# Patient Record
Sex: MALE | Race: WHITE | NOT HISPANIC OR LATINO | URBAN - METROPOLITAN AREA
[De-identification: names, ages, dates, MRNs, and addresses within clinical notes are randomized per-mention and may not be internally consistent; named-entity substitution may affect disease eponyms.]

---

## 2020-11-03 ENCOUNTER — HOSPITAL ENCOUNTER (EMERGENCY)
Facility: HOSPITAL | Age: 50
Discharge: HOME | End: 2020-11-03
Attending: EMERGENCY MEDICINE
Payer: COMMERCIAL

## 2020-11-03 VITALS
SYSTOLIC BLOOD PRESSURE: 117 MMHG | BODY MASS INDEX: 37.77 KG/M2 | HEART RATE: 71 BPM | WEIGHT: 255 LBS | DIASTOLIC BLOOD PRESSURE: 58 MMHG | TEMPERATURE: 98 F | HEIGHT: 69 IN | OXYGEN SATURATION: 98 % | RESPIRATION RATE: 18 BRPM

## 2020-11-03 DIAGNOSIS — S01.81XA FACIAL LACERATION, INITIAL ENCOUNTER: ICD-10-CM

## 2020-11-03 DIAGNOSIS — S09.90XA INJURY OF HEAD, INITIAL ENCOUNTER: Primary | ICD-10-CM

## 2020-11-03 DIAGNOSIS — T14.90XA TRAUMA: ICD-10-CM

## 2020-11-03 PROCEDURE — 25000003 PHARM REV CODE 250: Performed by: EMERGENCY MEDICINE

## 2020-11-03 PROCEDURE — 90715 TDAP VACCINE 7 YRS/> IM: CPT | Performed by: EMERGENCY MEDICINE

## 2020-11-03 PROCEDURE — 99285 EMERGENCY DEPT VISIT HI MDM: CPT | Mod: 25

## 2020-11-03 PROCEDURE — 90471 IMMUNIZATION ADMIN: CPT | Performed by: EMERGENCY MEDICINE

## 2020-11-03 PROCEDURE — 63600175 PHARM REV CODE 636 W HCPCS: Performed by: EMERGENCY MEDICINE

## 2020-11-03 PROCEDURE — 12011 RPR F/E/E/N/L/M 2.5 CM/<: CPT

## 2020-11-03 RX ORDER — CYCLOBENZAPRINE HCL 10 MG
10 TABLET ORAL 3 TIMES DAILY PRN
Qty: 15 TABLET | Refills: 0 | Status: SHIPPED | OUTPATIENT
Start: 2020-11-03 | End: 2020-11-08

## 2020-11-03 RX ORDER — BACITRACIN 500 [USP'U]/G
OINTMENT TOPICAL 3 TIMES DAILY
Status: DISCONTINUED | OUTPATIENT
Start: 2020-11-03 | End: 2020-11-03 | Stop reason: HOSPADM

## 2020-11-03 RX ORDER — LIDOCAINE HYDROCHLORIDE 10 MG/ML
10 INJECTION INFILTRATION; PERINEURAL
Status: COMPLETED | OUTPATIENT
Start: 2020-11-03 | End: 2020-11-03

## 2020-11-03 RX ORDER — IBUPROFEN 600 MG/1
600 TABLET ORAL EVERY 6 HOURS PRN
Qty: 20 TABLET | Refills: 0 | Status: SHIPPED | OUTPATIENT
Start: 2020-11-03

## 2020-11-03 RX ADMIN — LIDOCAINE HYDROCHLORIDE 10 ML: 10 INJECTION, SOLUTION INFILTRATION; PERINEURAL at 01:11

## 2020-11-03 RX ADMIN — BACITRACIN: 500 OINTMENT TOPICAL at 01:11

## 2020-11-03 RX ADMIN — CLOSTRIDIUM TETANI TOXOID ANTIGEN (FORMALDEHYDE INACTIVATED), CORYNEBACTERIUM DIPHTHERIAE TOXOID ANTIGEN (FORMALDEHYDE INACTIVATED), BORDETELLA PERTUSSIS TOXOID ANTIGEN (GLUTARALDEHYDE INACTIVATED), BORDETELLA PERTUSSIS FILAMENTOUS HEMAGGLUTININ ANTIGEN (FORMALDEHYDE INACTIVATED), BORDETELLA PERTUSSIS PERTACTIN ANTIGEN, AND BORDETELLA PERTUSSIS FIMBRIAE 2/3 ANTIGEN 0.5 ML: 5; 2; 2.5; 5; 3; 5 INJECTION, SUSPENSION INTRAMUSCULAR at 12:11

## 2020-11-03 NOTE — ED PROVIDER NOTES
Encounter Date: 11/3/2020    SCRIBE #1 NOTE: I, Gabe Sommer, am scribing for, and in the presence of,  Leland Rodriguez MD. I have scribed the following portions of the note - Other sections scribed: HPI, ROS, PE.       History     Chief Complaint   Patient presents with    Head Injury     Door motor and gear fell 20 feet from air and hit pateint while he was at work. Pt has laceration to lateral right eye and burising to right shoulder. Reports LOC approx 5 sec    Shoulder Pain     This 50 y.o M with a hx of High cholesterol presents to the ED via [facility] c/o a head injury and right shoulder pain after a 60 pound motor fell on him from approximately 20 feet. He was wearing a hard hat at the time. He reports LOC lasting approximately 5 seconds. He also c/o a laceration near the right eye and an abrasion to the right shoulder. Per EMS, the pt was ambulatory on the scene. He did not arrive with a C-collar. He cannot recall his most recent tetanus vaccination. He denies fever, chills, diaphoresis, numbness, nausea, emesis, diarrhea, abdominal pain, chest pain, SOB, hematuria, rash and any other associated symptoms. He does not smoke cigarettes, consume EtOH or use illicit drugs.    The history is provided by the patient.     Review of patient's allergies indicates:  No Known Allergies  Past Medical History:   Diagnosis Date    High cholesterol      Past Surgical History:   Procedure Laterality Date    HERNIA REPAIR      LIPOMA RESECTION       History reviewed. No pertinent family history.  Social History     Tobacco Use    Smoking status: Never Smoker   Substance Use Topics    Alcohol use: Yes     Comment: occ    Drug use: Never     Review of Systems   Constitutional: Negative for chills and fever.   HENT: Negative for rhinorrhea and sore throat.    Eyes: Negative for redness.   Respiratory: Negative for cough.    Cardiovascular: Negative for chest pain.   Gastrointestinal: Negative for abdominal pain,  diarrhea, nausea and vomiting.   Genitourinary: Negative for dysuria.   Musculoskeletal: Negative for back pain.        (+) R shoulder pain   Skin: Negative for color change and rash.        (+) laceration near the right eye   (+) abrasion to the right shoulder   Neurological: Positive for syncope. Negative for weakness.   Psychiatric/Behavioral: The patient is not nervous/anxious.        Physical Exam     Initial Vitals [11/03/20 1120]   BP Pulse Resp Temp SpO2   105/69 68 18 97.7 °F (36.5 °C) 95 %      MAP       --         Physical Exam    Nursing note and vitals reviewed.  Constitutional: He appears well-developed and well-nourished.   HENT:   Head: Normocephalic and atraumatic.   Mouth/Throat: Mucous membranes are normal.   Patent   Eyes: Conjunctivae and EOM are normal. Pupils are equal, round, and reactive to light. Right conjunctiva is not injected. Left conjunctiva is not injected.   sclera anicteric   Neck: Full passive range of motion without pain. Neck supple.   Cardiovascular: Regular rhythm, S1 normal, S2 normal and normal heart sounds. Exam reveals no gallop and no friction rub.    No murmur heard.  Pulses:       Radial pulses are 2+ on the right side and 2+ on the left side.   Pulmonary/Chest: Effort normal and breath sounds normal. No respiratory distress.   Abdominal: Soft. Normal appearance. He exhibits no distension. There is no abdominal tenderness.   Musculoskeletal:      Right shoulder: He exhibits tenderness.      Comments: No tenderness to elbows including olecranon process bilaterally. No axial loading. No pain to snuff box bilaterally. No pain to pelvis.      Neurological: He is alert. No cranial nerve deficit or sensory deficit. Gait normal.   A&Ox4, normal speech. No C, T or L spine tenderness.    Skin: Skin is warm. No ecchymosis and no rash noted.   No bruising on the back. V shaped laceration near the R eye. Abrasion to the R shoulder.    Psychiatric: He has a normal mood and affect.  Thought content normal.         ED Course   Lac Repair    Date/Time: 11/3/2020 3:23 PM  Performed by: Leland Rodriguez MD  Authorized by: Leland Rodriguez MD     Consent:     Consent obtained:  Verbal    Consent given by:  Patient    Alternatives discussed:  No treatment  Anesthesia (see MAR for exact dosages):     Anesthesia method:  None  Laceration details:     Location:  Face    Face location:  R cheek    Length (cm):  1  Repair type:     Repair type:  Simple  Pre-procedure details:     Preparation:  Patient was prepped and draped in usual sterile fashion  Exploration:     Hemostasis achieved with:  Direct pressure    Wound exploration: wound explored through full range of motion      Contaminated: no    Treatment:     Area cleansed with:  Soap and water and saline    Amount of cleaning:  Extensive    Irrigation solution:  Sterile saline    Irrigation volume:  500    Irrigation method:  Pressure wash    Visualized foreign bodies/material removed: no    Skin repair:     Repair method:  Sutures    Suture size:  5-0    Suture material:  Prolene    Suture technique:  Simple interrupted    Number of sutures:  2  Approximation:     Approximation:  Close  Post-procedure details:     Dressing:  Antibiotic ointment and sterile dressing    Patient tolerance of procedure:  Tolerated well, no immediate complications      Labs Reviewed - No data to display       Imaging Results          CT Lumbar Spine Without Contrast (Final result)  Result time 11/03/20 12:56:15    Final result by Jefferson Mahmood MD (11/03/20 12:56:15)                 Impression:      No acute fracture or traumatic subluxation involving the thoracic or lumbar spine.      Electronically signed by: Jefferson Mahmood  Date:    11/03/2020  Time:    12:56             Narrative:    EXAMINATION:  CT LUMBAR SPINE WITHOUT CONTRAST; CT THORACIC SPINE WITHOUT CONTRAST    CLINICAL HISTORY:  L/S-spine fracture, traumatic;; Polytrauma, critical, T/L spine injury  suspected;    TECHNIQUE:  Low-dose axial, sagittal and coronal reformations are obtained through the thoracic and lumbar spine.  Contrast was not administered.    COMPARISON:  None.    FINDINGS:  No acute fracture or traumatic subluxation.  Relatively modest degenerative findings of the intervertebral discs and facet joints without critical spinal canal or foraminal narrowing.    Subsegmental atelectasis is suggested within the visualized lungs.  Visualized contents of the abdomen and pelvis are unremarkable.                               X-Ray Chest AP Portable (Final result)  Result time 11/03/20 12:48:17    Final result by Jefferson Mahmood MD (11/03/20 12:48:17)                 Impression:      No evidence of acute cardiopulmonary disease.      Electronically signed by: Jefferson Mahmood  Date:    11/03/2020  Time:    12:48             Narrative:    EXAMINATION:  XR CHEST AP PORTABLE    CLINICAL HISTORY:  trauma;    TECHNIQUE:  Single frontal view of the chest was performed.    COMPARISON:  None    FINDINGS:  Cardiomediastinal silhouette within normal limits.  Lungs clear.  Elevation right hemidiaphragm.  No pneumothorax or pleural effusion.  No acute findings suggested in the visualized upper abdomen.  Osseous and soft tissue structures without finding.                               X-ray Shoulder 2 or More Views Right (Final result)  Result time 11/03/20 12:49:53   Procedure changed from X-Ray Shoulder Trauma Right     Final result by Jefferson Mahmood MD (11/03/20 12:49:53)                 Impression:      No acute fracture or dislocation.      Electronically signed by: Jefferson Mahmood  Date:    11/03/2020  Time:    12:49             Narrative:    EXAMINATION:  XR SHOULDER COMPLETE 2 OR MORE VIEWS RIGHT    CLINICAL HISTORY:  trauma;Injury, unspecified, initial encounter    TECHNIQUE:  Two or three views of the right shoulder were performed.    COMPARISON:  None    FINDINGS:  No acute fracture or dislocation.   Degenerative findings are noted at the acromioclavicular joint.  Soft tissue structures unremarkable.                               CT Thoracic Spine Without Contrast (Final result)  Result time 11/03/20 12:56:15    Final result by Jefferson Mahmood MD (11/03/20 12:56:15)                 Impression:      No acute fracture or traumatic subluxation involving the thoracic or lumbar spine.      Electronically signed by: Jefferson Mahmood  Date:    11/03/2020  Time:    12:56             Narrative:    EXAMINATION:  CT LUMBAR SPINE WITHOUT CONTRAST; CT THORACIC SPINE WITHOUT CONTRAST    CLINICAL HISTORY:  L/S-spine fracture, traumatic;; Polytrauma, critical, T/L spine injury suspected;    TECHNIQUE:  Low-dose axial, sagittal and coronal reformations are obtained through the thoracic and lumbar spine.  Contrast was not administered.    COMPARISON:  None.    FINDINGS:  No acute fracture or traumatic subluxation.  Relatively modest degenerative findings of the intervertebral discs and facet joints without critical spinal canal or foraminal narrowing.    Subsegmental atelectasis is suggested within the visualized lungs.  Visualized contents of the abdomen and pelvis are unremarkable.                               CT Cervical Spine Without Contrast (Final result)  Result time 11/03/20 12:37:37    Final result by Austin Sheridan MD (11/03/20 12:37:37)                 Impression:      No CT evidence of cervical spine acute osseous traumatic injury.    Cervical spondylosis most prominent at C5-6 level, as above.      Electronically signed by: Austin Sheridan MD  Date:    11/03/2020  Time:    12:37             Narrative:    EXAMINATION:  CT CERVICAL SPINE WITHOUT CONTRAST    CLINICAL HISTORY:  Neck pain, recent trauma;    TECHNIQUE:  Low dose axial images, sagittal and coronal reformations were performed though the cervical spine.  Contrast was not administered.    COMPARISON:  None    FINDINGS:  Bones are well mineralized.   Levocurvature with straightening of the cervical lordosis, likely related to positioning or muscle strain.  Vertebral body heights appear relatively maintained.  No displaced fracture, dislocation or significant listhesis.  No prevertebral soft tissue swelling.  No paraspinal mass or fluid collection.  Mild degenerative change at the atlantodental interval.  Dens and lateral masses are otherwise well aligned and intact.  Slight loss of disc height with endplate changes and small marginal osteophytes with uncovertebral and facet arthrosis at C5-6 level.  Remaining intervertebral disc space heights are maintained.  Minimal to mild degenerative change elsewhere.    C2-3: Mild right neural foraminal narrowing.  No significant spinal canal stenosis or left neural foraminal narrowing.    C3-4: Moderate right neural foraminal narrowing.  No significant spinal canal stenosis or left neural foraminal narrowing.    C4-5: Posterior central disc protrusion resulting in minimal acquired canal stenosis.  Minimal right neural foraminal narrowing.  No significant left neural foraminal narrowing.    C5-6: Posterior disc osteophyte complex asymmetric to the left resulting in mild to moderate acquired canal stenosis.  Moderate right and mild left neural foraminal narrowing.    C6-7: Posterior broad-based disc bulge resulting in minimal acquired canal stenosis.  No significant neural foraminal narrowing.    C7-T1: No significant spinal canal stenosis or neural foraminal narrowing.    Included airway is midline and patent.  No apical pneumothorax.                               CT Head Without Contrast (Final result)  Result time 11/03/20 12:14:02    Final result by Austin Sheridan MD (11/03/20 12:14:02)                 Impression:      No acute intracranial abnormality.      Electronically signed by: Austin Sheridan MD  Date:    11/03/2020  Time:    12:14             Narrative:    EXAMINATION:  CT HEAD WITHOUT CONTRAST    CLINICAL  HISTORY:  Head trauma, mod-severe;    TECHNIQUE:  Low dose axial CT images obtained throughout the head without intravenous contrast. Sagittal and coronal reconstructions were performed.    COMPARISON:  None.    FINDINGS:  Intracranial compartment:    Ventricles and sulci are normal in size for age without evidence of hydrocephalus. No extra-axial blood or fluid collections.    The brain parenchyma appears normal. No parenchymal mass, hemorrhage, edema or major vascular distribution infarct.    Skull/extracranial contents (limited evaluation): No fracture.  Mild mucosal thickening within the bilateral maxillary sinuses.  Mastoid air cells and remaining imaged paranasal sinuses are essentially clear.  Imaged portions of the orbits are within normal limits.                                 Medical Decision Making:   Initial Assessment:   42-year-old male presenting secondary to a major mechanism trauma to his head.  Concerned about potential compression fractures due to the mechanism.  Exam however is reassuring.  Vitals are reassuring.  Does have laceration that was repaired.  Tetanus updated.  Abrasion to right shoulder.  Getting imaging on this area along with a CT head, face, C-spine, T-spine, and L-spine.  No pelvic tenderness.  No tenderness to the heels bilaterally.  Patient denies any heel pain or knee pain.  Do not think imaging of this area is needed at this time.  Will reassess.  Patient was placed in spinal precautions in a C-collar was applied.    S imaging reassuring on the patient.  Still no C or T or L-spine tenderness.  Full C-spine neurological examination reassuring and patient is able to range his neck.  Patient still does not want anything for pain.  Laceration repaired without incident.  Patient was instructed please get sutures removed in 7-10 days.  States that he will.  Tolerating p.o.. I discussed with the patient/family the diagnosis, treatment plan, indications for return to the emergency  department, and for expected follow-up. The patient/family verbalized an understanding. The patient/family is asked if there are any questions or concerns. We discuss the case, until all issues are addressed to the patient/familys satisfaction. Patient/family understands and is agreeable to the plan.  Blunt head trauma return precautions given to patient along with wife.  Leland Rodriguez      Independently Interpreted Test(s):   I have ordered and independently interpreted EKG Reading(s) - see prior notes  Clinical Tests:   Lab Tests: Ordered and Reviewed  Radiological Study: Ordered and Reviewed            Scribe Attestation:   Scribe #1: I performed the above scribed service and the documentation accurately describes the services I performed. I attest to the accuracy of the note.                      Clinical Impression:       ICD-10-CM ICD-9-CM   1. Injury of head, initial encounter  S09.90XA 959.01   2. Trauma  T14.90XA 959.9   3. Facial laceration, initial encounter  S01.81XA 873.40                          ED Disposition Condition    Discharge Stable        ED Prescriptions     Medication Sig Dispense Start Date End Date Auth. Provider    ibuprofen (ADVIL,MOTRIN) 600 MG tablet Take 1 tablet (600 mg total) by mouth every 6 (six) hours as needed. 20 tablet 11/3/2020  Leland Rodriguez MD    cyclobenzaprine (FLEXERIL) 10 MG tablet Take 1 tablet (10 mg total) by mouth 3 (three) times daily as needed. 15 tablet 11/3/2020 11/8/2020 Leland Rodriguez MD        Follow-up Information     Follow up With Specialties Details Why Contact Info    [facility]  Schedule an appointment as soon as possible for a visit in 2 days  [address] [place] [address]  [phone]                            I, leland rodriguez, personally performed the services described in this documentation. All medical record entries made by the scribe were at my direction and in my presence. I have reviewed the chart and agree that the  record reflects my personal performance and is accurate and complete.               Leland Rodriguez MD  11/03/20 0660

## 2020-11-03 NOTE — ED TRIAGE NOTES
Pt reports to ED after an electric motor for a overhead door fell about 20-25 ft onto his head. Pt states the motor weighed about 5-60lbs. Pt has mild abrasions to R forehead and R shoulder. Pt has no neuro deficits. Pt denies numbness/tingling, slurred speech, weakness. Pt is ANOx4. Cervical collar applied upon arrival to ED.   [NL] : warm [FreeTextEntry5] : Conjunctiva and sclera are clear bilaterally  [FreeTextEntry3] : Canals look fine and are not tender to manipulation.

## 2020-11-03 NOTE — ED NOTES
Bed: 11main  Expected date:   Expected time:   Means of arrival:   Comments:  EMS   Sputum Cx results

## 2020-11-03 NOTE — DISCHARGE INSTRUCTIONS
Thank you for coming to our Emergency Department today. It is important to remember that some problems are difficult to diagnose and may not be found during your first visit. Be sure to follow up with your primary care doctor and review any labs/imaging that was performed with them. If you do not have a primary care doctor, you may contact the one listed on your discharge paperwork or you may also call the [facility] Appointment Desk at [phone] to schedule an appointment with one.     All medications may potentially have side effects and it is impossible to predict which medications may give you side effects. If you feel that you are having a negative effect of any medication you should immediately stop taking them and seek medical attention.    Return to the ER with any questions/concerns, new/concerning symptoms, worsening or failure to improve. Do not drive or make any important decisions for 24 hours if you have received any pain medications, sedatives or mood altering drugs during your ER visit.

## 2020-11-03 NOTE — ED NOTES
RN irrigated wound with 500ml NS and splash cap for pressure. Pt tolerated well. Suture tray at bedside.

## 2021-01-03 ENCOUNTER — CLINICAL SUPPORT (OUTPATIENT)
Dept: URGENT CARE | Facility: CLINIC | Age: 51
End: 2021-01-03
Payer: COMMERCIAL

## 2021-01-03 DIAGNOSIS — Z11.59 ENCOUNTER FOR SCREENING FOR OTHER VIRAL DISEASES: Primary | ICD-10-CM

## 2021-01-03 DIAGNOSIS — U07.1 COVID-19 VIRUS DETECTED: ICD-10-CM

## 2021-01-03 LAB
CTP QC/QA: YES
SARS-COV-2 RDRP RESP QL NAA+PROBE: POSITIVE

## 2021-01-03 PROCEDURE — U0002: ICD-10-PCS | Mod: QW,S$GLB,, | Performed by: INTERNAL MEDICINE

## 2021-01-03 PROCEDURE — U0002 COVID-19 LAB TEST NON-CDC: HCPCS | Mod: QW,S$GLB,, | Performed by: INTERNAL MEDICINE
